# Patient Record
Sex: FEMALE | Race: OTHER | NOT HISPANIC OR LATINO | URBAN - METROPOLITAN AREA
[De-identification: names, ages, dates, MRNs, and addresses within clinical notes are randomized per-mention and may not be internally consistent; named-entity substitution may affect disease eponyms.]

---

## 2020-03-10 VITALS
HEART RATE: 74 BPM | HEIGHT: 69 IN | WEIGHT: 153 LBS | OXYGEN SATURATION: 100 % | TEMPERATURE: 97 F | SYSTOLIC BLOOD PRESSURE: 110 MMHG | DIASTOLIC BLOOD PRESSURE: 73 MMHG | RESPIRATION RATE: 16 BRPM

## 2020-03-10 NOTE — ASU PATIENT PROFILE, ADULT - PSH
Brain tumor    Breast cyst, right  aspiration Brain tumor    Breast cyst, right  aspiration  H/O eye surgery

## 2020-03-11 ENCOUNTER — OUTPATIENT (OUTPATIENT)
Dept: OUTPATIENT SERVICES | Facility: HOSPITAL | Age: 30
LOS: 1 days | Discharge: HOME | End: 2020-03-11
Payer: MEDICAID

## 2020-03-11 DIAGNOSIS — N60.01 SOLITARY CYST OF RIGHT BREAST: Chronic | ICD-10-CM

## 2020-03-11 DIAGNOSIS — D49.6 NEOPLASM OF UNSPECIFIED BEHAVIOR OF BRAIN: Chronic | ICD-10-CM

## 2020-03-11 DIAGNOSIS — Z98.890 OTHER SPECIFIED POSTPROCEDURAL STATES: Chronic | ICD-10-CM

## 2020-03-11 LAB
ANION GAP SERPL CALC-SCNC: 12 MMOL/L — SIGNIFICANT CHANGE UP (ref 5–17)
APTT BLD: 31.7 SEC — SIGNIFICANT CHANGE UP (ref 27.5–36.3)
BUN SERPL-MCNC: 11 MG/DL — SIGNIFICANT CHANGE UP (ref 7–23)
CALCIUM SERPL-MCNC: 9.3 MG/DL — SIGNIFICANT CHANGE UP (ref 8.4–10.5)
CHLORIDE SERPL-SCNC: 103 MMOL/L — SIGNIFICANT CHANGE UP (ref 96–108)
CO2 SERPL-SCNC: 25 MMOL/L — SIGNIFICANT CHANGE UP (ref 22–31)
CREAT SERPL-MCNC: 0.66 MG/DL — SIGNIFICANT CHANGE UP (ref 0.5–1.3)
GLUCOSE SERPL-MCNC: 104 MG/DL — HIGH (ref 70–99)
HCT VFR BLD CALC: 41.8 % — SIGNIFICANT CHANGE UP (ref 34.5–45)
HGB BLD-MCNC: 13.4 G/DL — SIGNIFICANT CHANGE UP (ref 11.5–15.5)
INR BLD: 0.92 — SIGNIFICANT CHANGE UP (ref 0.88–1.16)
MCHC RBC-ENTMCNC: 30 PG — SIGNIFICANT CHANGE UP (ref 27–34)
MCHC RBC-ENTMCNC: 32.1 GM/DL — SIGNIFICANT CHANGE UP (ref 32–36)
MCV RBC AUTO: 93.5 FL — SIGNIFICANT CHANGE UP (ref 80–100)
NRBC # BLD: 0 /100 WBCS — SIGNIFICANT CHANGE UP (ref 0–0)
PLATELET # BLD AUTO: 180 K/UL — SIGNIFICANT CHANGE UP (ref 150–400)
POTASSIUM SERPL-MCNC: 4.2 MMOL/L — SIGNIFICANT CHANGE UP (ref 3.5–5.3)
POTASSIUM SERPL-SCNC: 4.2 MMOL/L — SIGNIFICANT CHANGE UP (ref 3.5–5.3)
PROTHROM AB SERPL-ACNC: 10.5 SEC — SIGNIFICANT CHANGE UP (ref 10–12.9)
RBC # BLD: 4.47 M/UL — SIGNIFICANT CHANGE UP (ref 3.8–5.2)
RBC # FLD: 12.8 % — SIGNIFICANT CHANGE UP (ref 10.3–14.5)
SODIUM SERPL-SCNC: 140 MMOL/L — SIGNIFICANT CHANGE UP (ref 135–145)
WBC # BLD: 6.77 K/UL — SIGNIFICANT CHANGE UP (ref 3.8–10.5)
WBC # FLD AUTO: 6.77 K/UL — SIGNIFICANT CHANGE UP (ref 3.8–10.5)

## 2020-03-11 RX ORDER — BACITRACIN ZINC 500 UNIT/G
1 OINTMENT IN PACKET (EA) TOPICAL
Refills: 0 | Status: DISCONTINUED | OUTPATIENT
Start: 2020-03-11 | End: 2020-03-12

## 2020-03-11 RX ORDER — DIPHENHYDRAMINE HCL 50 MG
25 CAPSULE ORAL AT BEDTIME
Refills: 0 | Status: DISCONTINUED | OUTPATIENT
Start: 2020-03-11 | End: 2020-03-12

## 2020-03-11 RX ORDER — ACETAMINOPHEN 500 MG
650 TABLET ORAL EVERY 6 HOURS
Refills: 0 | Status: DISCONTINUED | OUTPATIENT
Start: 2020-03-11 | End: 2020-03-12

## 2020-03-11 RX ORDER — LABETALOL HCL 100 MG
10 TABLET ORAL EVERY 4 HOURS
Refills: 0 | Status: DISCONTINUED | OUTPATIENT
Start: 2020-03-11 | End: 2020-03-12

## 2020-03-11 RX ORDER — OXYCODONE HYDROCHLORIDE 5 MG/1
5 TABLET ORAL EVERY 6 HOURS
Refills: 0 | Status: DISCONTINUED | OUTPATIENT
Start: 2020-03-11 | End: 2020-03-12

## 2020-03-11 RX ORDER — ONDANSETRON 8 MG/1
4 TABLET, FILM COATED ORAL EVERY 6 HOURS
Refills: 0 | Status: DISCONTINUED | OUTPATIENT
Start: 2020-03-11 | End: 2020-03-12

## 2020-03-11 RX ORDER — SODIUM CHLORIDE 9 MG/ML
1000 INJECTION, SOLUTION INTRAVENOUS
Refills: 0 | Status: DISCONTINUED | OUTPATIENT
Start: 2020-03-11 | End: 2020-03-12

## 2020-03-11 RX ORDER — OXYCODONE HYDROCHLORIDE 5 MG/1
10 TABLET ORAL EVERY 6 HOURS
Refills: 0 | Status: DISCONTINUED | OUTPATIENT
Start: 2020-03-11 | End: 2020-03-12

## 2020-03-11 RX ORDER — MORPHINE SULFATE 50 MG/1
2 CAPSULE, EXTENDED RELEASE ORAL
Refills: 0 | Status: DISCONTINUED | OUTPATIENT
Start: 2020-03-11 | End: 2020-03-11

## 2020-03-11 RX ORDER — ERYTHROMYCIN BASE 5 MG/GRAM
1 OINTMENT (GRAM) OPHTHALMIC (EYE) EVERY 6 HOURS
Refills: 0 | Status: DISCONTINUED | OUTPATIENT
Start: 2020-03-11 | End: 2020-03-12

## 2020-03-11 RX ADMIN — MORPHINE SULFATE 2 MILLIGRAM(S): 50 CAPSULE, EXTENDED RELEASE ORAL at 19:56

## 2020-03-11 RX ADMIN — Medication 1 DROP(S): at 19:53

## 2020-03-11 RX ADMIN — Medication 0.5 MILLIGRAM(S): at 23:10

## 2020-03-11 RX ADMIN — MORPHINE SULFATE 2 MILLIGRAM(S): 50 CAPSULE, EXTENDED RELEASE ORAL at 21:27

## 2020-03-11 RX ADMIN — Medication 1 DROP(S): at 19:52

## 2020-03-11 RX ADMIN — MORPHINE SULFATE 2 MILLIGRAM(S): 50 CAPSULE, EXTENDED RELEASE ORAL at 21:35

## 2020-03-11 RX ADMIN — MORPHINE SULFATE 2 MILLIGRAM(S): 50 CAPSULE, EXTENDED RELEASE ORAL at 19:36

## 2020-03-11 RX ADMIN — MORPHINE SULFATE 2 MILLIGRAM(S): 50 CAPSULE, EXTENDED RELEASE ORAL at 19:33

## 2020-03-11 RX ADMIN — Medication 2 DROP(S): at 23:08

## 2020-03-11 RX ADMIN — MORPHINE SULFATE 2 MILLIGRAM(S): 50 CAPSULE, EXTENDED RELEASE ORAL at 18:51

## 2020-03-11 RX ADMIN — Medication 1 APPLICATION(S): at 23:10

## 2020-03-11 NOTE — H&P ADULT - HISTORY OF PRESENT ILLNESS
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 Doing well post Op.

## 2020-03-11 NOTE — H&P ADULT - NSHPPHYSICALEXAM_GEN_ALL_CORE
Gen -NAD  face- bilateral facelift incisions c/d/i, perialar incision c/d/i. Currently with full eye closure.   DARLIN in -place in bilateral face with ss output  R leg with ACE/kerlix in place, DARLIN with ss output

## 2020-03-11 NOTE — H&P ADULT - ASSESSMENT
29F s/p R endoscopic sural nerve harvest, cross face R buccal to L buccal, L 5 to 7, TFL sling. Doing well post Op.   -Admit to ENT under Dr LI velasquez  -monitor DARLIN output  -CLD advnace to soft  -PRN pain/nausea  -ambulate as tolerated  -HOB elevation/Leg elevation while in bed  -eye care

## 2020-03-11 NOTE — BRIEF OPERATIVE NOTE - NSICDXBRIEFPROCEDURE_GEN_ALL_CORE_FT
PROCEDURES:  Anastomosis, masseteric nerve to facial nerve, for facial reanimation 11-Mar-2020 17:20:57  Mark Castano  Surgical procurement of sural nerve 11-Mar-2020 17:20:23  Mark Castano  Dissection of both facial nerves 11-Mar-2020 17:20:05  Mark Castano

## 2020-03-12 VITALS
SYSTOLIC BLOOD PRESSURE: 120 MMHG | OXYGEN SATURATION: 95 % | TEMPERATURE: 97 F | DIASTOLIC BLOOD PRESSURE: 76 MMHG | RESPIRATION RATE: 17 BRPM | HEART RATE: 90 BPM

## 2020-03-12 PROCEDURE — 30465 REPAIR NASAL STENOSIS: CPT

## 2020-03-12 PROCEDURE — 86850 RBC ANTIBODY SCREEN: CPT

## 2020-03-12 PROCEDURE — C9353: CPT

## 2020-03-12 PROCEDURE — 64886 NERVE GRAFT HEAD/NECK >4 CM: CPT

## 2020-03-12 PROCEDURE — 85730 THROMBOPLASTIN TIME PARTIAL: CPT

## 2020-03-12 PROCEDURE — 85027 COMPLETE CBC AUTOMATED: CPT

## 2020-03-12 PROCEDURE — C1889: CPT

## 2020-03-12 PROCEDURE — 86901 BLOOD TYPING SEROLOGIC RH(D): CPT

## 2020-03-12 PROCEDURE — 15840 NERVE PALSY FASCIAL GRAFT: CPT | Mod: LT

## 2020-03-12 PROCEDURE — 36415 COLL VENOUS BLD VENIPUNCTURE: CPT

## 2020-03-12 PROCEDURE — 80048 BASIC METABOLIC PNL TOTAL CA: CPT

## 2020-03-12 PROCEDURE — 85610 PROTHROMBIN TIME: CPT

## 2020-03-12 RX ORDER — ONDANSETRON 8 MG/1
1 TABLET, FILM COATED ORAL
Qty: 6 | Refills: 0
Start: 2020-03-12

## 2020-03-12 RX ORDER — ERYTHROMYCIN BASE 5 MG/GRAM
1 OINTMENT (GRAM) OPHTHALMIC (EYE)
Qty: 30 | Refills: 0
Start: 2020-03-12 | End: 2020-03-14

## 2020-03-12 RX ORDER — OXYCODONE HYDROCHLORIDE 5 MG/1
1 TABLET ORAL
Qty: 20 | Refills: 0
Start: 2020-03-12

## 2020-03-12 RX ORDER — BACITRACIN ZINC 500 UNIT/G
1 OINTMENT IN PACKET (EA) TOPICAL
Qty: 28 | Refills: 0
Start: 2020-03-12 | End: 2020-03-13

## 2020-03-12 RX ADMIN — Medication 1 APPLICATION(S): at 06:30

## 2020-03-12 RX ADMIN — ONDANSETRON 4 MILLIGRAM(S): 8 TABLET, FILM COATED ORAL at 11:24

## 2020-03-12 RX ADMIN — OXYCODONE HYDROCHLORIDE 10 MILLIGRAM(S): 5 TABLET ORAL at 01:08

## 2020-03-12 RX ADMIN — Medication 2 DROP(S): at 15:14

## 2020-03-12 RX ADMIN — OXYCODONE HYDROCHLORIDE 10 MILLIGRAM(S): 5 TABLET ORAL at 00:08

## 2020-03-12 RX ADMIN — Medication 1 APPLICATION(S): at 12:17

## 2020-03-12 RX ADMIN — OXYCODONE HYDROCHLORIDE 10 MILLIGRAM(S): 5 TABLET ORAL at 12:41

## 2020-03-12 RX ADMIN — Medication 1 APPLICATION(S): at 06:31

## 2020-03-12 RX ADMIN — OXYCODONE HYDROCHLORIDE 10 MILLIGRAM(S): 5 TABLET ORAL at 07:39

## 2020-03-12 RX ADMIN — Medication 1 APPLICATION(S): at 00:08

## 2020-03-12 RX ADMIN — OXYCODONE HYDROCHLORIDE 10 MILLIGRAM(S): 5 TABLET ORAL at 13:41

## 2020-03-12 RX ADMIN — Medication 875 MILLIGRAM(S): at 06:30

## 2020-03-12 RX ADMIN — OXYCODONE HYDROCHLORIDE 10 MILLIGRAM(S): 5 TABLET ORAL at 06:39

## 2020-03-12 RX ADMIN — Medication 2 DROP(S): at 06:30

## 2020-03-12 NOTE — DISCHARGE NOTE PROVIDER - NSDCFUADDINST_GEN_ALL_CORE_FT
General Discharge Instructions:  Please resume all regular home medications unless specifically advised not to take a particular medication. Also, please take any new medications as prescribed.  Please get plenty of rest, continue to ambulate several times per day, and drink adequate amounts of fluids. Avoid lifting weights greater than 5-10 lbs until you follow-up with your surgeon, who will instruct you further regarding activity restrictions.  Avoid driving or operating heavy machinery while taking pain medications.  Please follow-up with your surgeon and Primary Care Provider (PCP) as advised.  Incision Care:  *Please call your doctor or nurse practitioner if you have increased pain, swelling, redness, or drainage from the incision site.  *Avoid swimming and baths until your follow-up appointment.  *You may shower, and wash surgical incisions with a mild soap and warm water. Gently pat the area dry.  *Your sutures will be removed at your follow-up appointment.    DARLIN Drain Care:  *Please look at the site every day for signs of infection (increased redness or pain, swelling, odor, yellow or bloody discharge, warm to touch, fever).  *Maintain suction of the bulb.  *Note color, consistency, and amount of fluid in the drain. Call the doctor, nurse practitioner, or VNA nurse if the amount increases significantly or changes in character.  *Be sure to empty the drain frequently. Record the output, if instructed to do so.  *You may shower; wash the area gently with warm, soapy water.  *Keep the insertion site clean and dry otherwise.  *Avoid swimming, baths, hot tubs; do not submerge yourself in water.  *Make sure to keep the drain attached securely to your body to prevent pulling or dislocation.    Warning Signs:  Please call your doctor or nurse practitioner if you experience the following:  *You experience new chest pain, pressure, squeezing or tightness.  *New or worsening cough, shortness of breath, or wheeze.  *If you are vomiting and cannot keep down fluids or your medications.   *Your pain is not improving within 8-12 hours or is not gone within 24 hours.  *You have shaking chills, or fever greater than 101.5 degrees Fahrenheit or 38 degrees Celsius.  *Any change in your symptoms, or any new symptoms that concern you.

## 2020-03-12 NOTE — DISCHARGE NOTE NURSING/CASE MANAGEMENT/SOCIAL WORK - NSDCFUADDAPPT_GEN_ALL_CORE_FT
Please follow up with Dr. JEFFERSON in one week; you may call the office at [phone] to make an appointment at your earliest convenience.

## 2020-03-12 NOTE — DISCHARGE NOTE PROVIDER - HOSPITAL COURSE
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 Doing well post Op.        Interval:    3/12: Pt complained of L eye pain yesterday. Erythromycin gtt added and ophtho consulted. AFVSS overnight with ROBSON. Pt with appropriate pain this AM. Eye pain improving but not resolved. 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 Doing well post Op.        Interval:    3/12: Pt complained of L eye pain yesterday. Erythromycin gtt added and ophtho consulted. AFVSS overnight with ROBSON. Pt with appropriate pain this AM. Eye pain improving but not resolved. Prior to discharge upon removal of ACE, pt noted to have erythema of R leg from level of ankle incision to about mid-calf. Incision c/d/i without purulence or dehiscence. Leg slightly TTP without warmth or areas of fluctuance. Thought to be secondary to brusiing from OR vs adhesives (pt very sensitive to adhesives at DARLIN sites). Discussion was had between team, patient, patient's mother and Dr. JEFFERSON who all felt comfortable with discharge with appropriate antibiotics and close follow-up.

## 2020-03-12 NOTE — DISCHARGE NOTE PROVIDER - NSDCFUADDAPPT_GEN_ALL_CORE_FT
Please follow up with Dr. JEFFERSON in one week; you may call the office at [phone] to make an appointment at your earliest convenience.

## 2020-03-12 NOTE — DISCHARGE NOTE NURSING/CASE MANAGEMENT/SOCIAL WORK - PATIENT PORTAL LINK FT
You can access the FollowMyHealth Patient Portal offered by [facility] by registering at the following website: [email or web address]. By joining [email or web address]’s FollowMyHealth portal, you will also be able to view your health information using other applications (apps) compatible with our system.

## 2020-03-12 NOTE — PROGRESS NOTE ADULT - ASSESSMENT
29F s/p R endoscopic sural nerve harvest, cross face R buccal to L buccal, L 5 to 7, TFL sling. Doing well post Op.     -augmentin  -monitor DARLIN output  -advnace to soft  -PRN pain/nausea  -ambulate as tolerated  -HOB elevation/Leg elevation while in bed  -eye care

## 2020-03-12 NOTE — DISCHARGE NOTE PROVIDER - CARE PROVIDERS DIRECT ADDRESSES
[email or web address]

## 2020-03-12 NOTE — DISCHARGE NOTE PROVIDER - CARE PROVIDER_API CALL
Kimmy JEFFERSON)  Otolaryngology  [address] [place] [address]  Phone: [phone]  Fax: [phone]  Follow Up Time:

## 2020-03-12 NOTE — DISCHARGE NOTE PROVIDER - NSDCMRMEDTOKEN_GEN_ALL_CORE_FT
amoxicillin-clavulanate 875 mg-125 mg oral tablet: 1 tab(s) orally every 12 hours MDD:2  Baciguent 500 units/g topical ointment: Apply topically to affected area 2 times a day   Eyemycin 0.5% ophthalmic ointment: 1 application to each affected eye every 6 hours  oxyCODONE 5 mg oral tablet: 1 tab(s) orally every 6 hours MDD:4  Zofran 4 mg oral tablet: 1 tab(s) orally every 6 hours, As Needed -for nausea

## 2020-03-12 NOTE — PROGRESS NOTE ADULT - SUBJECTIVE AND OBJECTIVE BOX
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 Doing well post Op.    Interval:  3/12: Pt complained of L eye pain yesterday. Erythromycin gtt added and ophtho consulted. AFVSS overnight with ROBSON. Pt with appropriate pain this AM. Eye pain improving but not resolved.       PAST MEDICAL & SURGICAL HISTORY:  Diplopia  Facial paralysis  Benign brain tumor  H/O eye surgery  Breast cyst, right: aspiration  Brain tumor    Allergies    nickel (Blisters)  No Known Drug Allergies    Intolerances      MEDICATIONS  (STANDING):  amoxicillin   80 mG/mL/clavulanate Suspension 875 milliGRAM(s) Oral every 12 hours  artificial tears (preservative free) Ophthalmic Solution 2 Drop(s) Both EYES three times a day  BACItracin   Ointment 1 Application(s) Topical two times a day  erythromycin   Ointment 1 Application(s) Left EYE every 6 hours  lactated ringers. 1000 milliLiter(s) (100 mL/Hr) IV Continuous <Continuous>  petrolatum Ophthalmic Ointment 1 Application(s) Both EYES at bedtime    MEDICATIONS  (PRN):  acetaminophen    Suspension .. 650 milliGRAM(s) Oral every 6 hours PRN Mild Pain (1 - 3)  artificial  tears Solution 1 Drop(s) Left EYE every 2 hours PRN Dry Eyes  diphenhydrAMINE 25 milliGRAM(s) Oral at bedtime PRN Insomnia  labetalol Injectable 10 milliGRAM(s) IV Push every 4 hours PRN Systolic blood pressure > 180  ondansetron Injectable 4 milliGRAM(s) IV Push every 6 hours PRN Nausea and/or Vomiting  oxyCODONE    IR 5 milliGRAM(s) Oral every 6 hours PRN Moderate Pain (4 - 6)  oxyCODONE    IR 10 milliGRAM(s) Oral every 6 hours PRN Severe Pain (7 - 10)        Vital Signs Last 24 Hrs  T(C): 36.9 (12 Mar 2020 05:39), Max: 37.1 (11 Mar 2020 22:18)  T(F): 98.5 (12 Mar 2020 05:39), Max: 98.8 (11 Mar 2020 22:18)  HR: 88 (12 Mar 2020 05:39) (72 - 88)  BP: 115/69 (12 Mar 2020 05:39) (106/56 - 120/72)  BP(mean): 78 (11 Mar 2020 21:49) (77 - 91)  RR: 18 (12 Mar 2020 05:39) (11 - 21)  SpO2: 95% (12 Mar 2020 05:39) (95% - 100%)    Physical Exam:  Gen -NAD  L eye- chemosis, erythema, epiphora   face- bilateral facelift incisions c/d/i, perialar incision c/d/i. Currently with full eye closure.   DARLIN in -place in bilateral face with ss output  R leg with ACE/kerlix in place, DARLIN with ss output      03-11-20 @ 07:01  -  03-12-20 @ 07:00  --------------------------------------------------------  IN: 900 mL / OUT: 861.5 mL / NET: 38.5 mL                              13.4   6.77  )-----------( 180      ( 11 Mar 2020 07:22 )             41.8    03-11    140  |  103  |  11  ----------------------------<  104<H>  4.2   |  25  |  0.66    Ca    9.3      11 Mar 2020 07:22     PT/INR - ( 11 Mar 2020 07:22 )   PT: 10.5 sec;   INR: 0.92          PTT - ( 11 Mar 2020 07:22 )  PTT:31.7 sec

## 2021-02-10 PROBLEM — H53.2 DIPLOPIA: Chronic | Status: ACTIVE | Noted: 2020-03-10

## 2021-02-10 PROBLEM — G51.0 BELL'S PALSY: Chronic | Status: ACTIVE | Noted: 2020-03-10

## 2021-02-10 PROBLEM — D33.2 BENIGN NEOPLASM OF BRAIN, UNSPECIFIED: Chronic | Status: ACTIVE | Noted: 2020-03-10

## 2021-02-11 ENCOUNTER — OUTPATIENT (OUTPATIENT)
Dept: OUTPATIENT SERVICES | Facility: HOSPITAL | Age: 31
LOS: 1 days | Discharge: HOME | End: 2021-02-11
Payer: MEDICAID

## 2021-02-11 DIAGNOSIS — Z98.890 OTHER SPECIFIED POSTPROCEDURAL STATES: Chronic | ICD-10-CM

## 2021-02-11 DIAGNOSIS — N60.01 SOLITARY CYST OF RIGHT BREAST: Chronic | ICD-10-CM

## 2021-02-11 DIAGNOSIS — D49.6 NEOPLASM OF UNSPECIFIED BEHAVIOR OF BRAIN: Chronic | ICD-10-CM

## 2021-02-11 PROCEDURE — 67961 REVISION OF EYELID: CPT | Mod: AS

## 2021-02-11 RX ORDER — LIDOCAINE 4 G/100G
15 CREAM TOPICAL
Qty: 300 | Refills: 0
Start: 2021-02-11 | End: 2021-02-15

## 2021-02-11 RX ORDER — OXYCODONE AND ACETAMINOPHEN 5; 325 MG/1; MG/1
1 TABLET ORAL
Qty: 30 | Refills: 0
Start: 2021-02-11 | End: 2021-02-15

## 2021-02-11 RX ORDER — TOBRAMYCIN AND DEXAMETHASONE 1; 3 MG/ML; MG/ML
2 SUSPENSION/ DROPS OPHTHALMIC
Qty: 3 | Refills: 0
Start: 2021-02-11 | End: 2021-02-17

## 2021-02-11 RX ORDER — AMOXICILLIN 250 MG/5ML
1 SUSPENSION, RECONSTITUTED, ORAL (ML) ORAL
Qty: 20 | Refills: 0
Start: 2021-02-11 | End: 2021-02-20
